# Patient Record
Sex: FEMALE | Race: BLACK OR AFRICAN AMERICAN
[De-identification: names, ages, dates, MRNs, and addresses within clinical notes are randomized per-mention and may not be internally consistent; named-entity substitution may affect disease eponyms.]

---

## 2017-07-29 ENCOUNTER — HOSPITAL ENCOUNTER (EMERGENCY)
Dept: HOSPITAL 63 - ER | Age: 37
LOS: 1 days | Discharge: HOME | End: 2017-07-30
Payer: COMMERCIAL

## 2017-07-29 VITALS — HEIGHT: 69 IN | BODY MASS INDEX: 43.4 KG/M2 | WEIGHT: 293 LBS

## 2017-07-29 VITALS — DIASTOLIC BLOOD PRESSURE: 81 MMHG | SYSTOLIC BLOOD PRESSURE: 132 MMHG

## 2017-07-29 DIAGNOSIS — I10: ICD-10-CM

## 2017-07-29 DIAGNOSIS — N39.0: Primary | ICD-10-CM

## 2017-07-29 PROCEDURE — 99284 EMERGENCY DEPT VISIT MOD MDM: CPT

## 2017-07-29 PROCEDURE — 81001 URINALYSIS AUTO W/SCOPE: CPT

## 2017-07-29 PROCEDURE — 87086 URINE CULTURE/COLONY COUNT: CPT

## 2017-07-30 LAB
APTT PPP: (no result) S
BACTERIA #/AREA URNS HPF: (no result) /HPF
BILIRUB UR QL STRIP: (no result)
FIBRINOGEN PPP-MCNC: (no result) MG/DL
GLUCOSE UR STRIP-MCNC: (no result) MG/DL
NITRITE UR QL STRIP: (no result)
RBC #/AREA URNS HPF: (no result) /HPF (ref 0–2)
SP GR UR STRIP: 1.01
SQUAMOUS #/AREA URNS LPF: (no result) /LPF
UROBILINOGEN UR-MCNC: 0.2 MG/DL

## 2017-08-02 NOTE — ED.ADGEN
Past History


Past Medical History:  Hypertension


Past Surgical History:  , Tubal ligation, Other


Alcohol Use:  Occasionally


Drug Use:  None





Adult General


Chief Complaint


Chief Complaint


Vaginal itching





HPI


HPI





Patient is a 36-year-old -American female presents with vaginal itching 

and dysuria symptoms began after unprotected intercourse beginning 2 weeks ago. 

Patient's concerned about possible STD or urinary tract infection. She is 

currently on her menstrual period which began 4 days ago. Patient took over-the-

counter treatment for yeast infection without improvement. Has not been 

evaluated by her primary care provider or another physician for complaint prior 

to ED arrival. Denies abdominal pain, pelvic pain, no fevers chills, nausea, 

vomiting and sweats. No other acute symptoms or complaints. Patient is 

currently on birth control.





Review of Systems


Review of Systems


Review symptoms as per history of present illness. All other review symptoms 

are negative.





Allergies


Allergies





Allergies








Coded Allergies Type Severity Reaction Last Updated Verified


 


  No Known Drug Allergies    11/5/15 No











Physical Exam


Physical Exam





Constitutional: Well developed, well nourished, no acute distress, non-toxic 

appearance. []


HENT: Normocephalic, atraumatic, bilateral external ears normal, oropharynx 

moist, no oral exudates, nose normal. []


Eyes: PERRL.]


Lungs & Thorax:  Bilateral breath sounds clear to auscultation []


Abdomen: Bowel sounds normal, soft, no tenderness, no masses, no pulsatile 

masses. []


Psychologic: Affect normal, judgement normal, mood normal. []





Current Patient Data


Vital Signs





 Vital Signs








  Date Time  Temp Pulse Resp B/P (MAP) Pulse Ox O2 Delivery O2 Flow Rate FiO2


 


17 23:20 98.0 82 20  99 Room Air  








Lab Results





 Laboratory Tests








Test


  17


23:59


 


Urine Collection Type Unknown  


 


Urine Color Straw  


 


Urine Clarity Hazy  


 


Urine pH 6.5  


 


Urine Specific Gravity 1.015  


 


Urine Protein


  Neg


(NEG-TRACE)


 


Urine Glucose (UA)


  Neg mg/dL


(NEG)


 


Urine Ketones (Stick)


  Neg mg/dL


(NEG)


 


Urine Blood Mod (NEG)  


 


Urine Nitrite Neg (NEG)  


 


Urine Bilirubin Neg (NEG)  


 


Urine Urobilinogen Dipstick


  0.2 mg/dL (0.2


mg/dL)


 


Urine Leukocyte Esterase Large (NEG)  


 


Urine RBC


  1-2 /HPF (0-2)


 


 


Urine WBC


  11-20 /HPF


(0-4)


 


Urine Squamous Epithelial


Cells Mod /LPF  


 


 


Urine Bacteria


  Mod /HPF


(0-FEW)


 


Urine Mucus Slight /LPF  








Microbiology


17 Urine Culture - Final, Complete


          


17 Urine Culture Result 1 (AMALIA) - Final, Complete





EKG


EKG


[]





Radiology/Procedures


Radiology/Procedures


[]





Course & Med Decision Making


Course & Med Decision Making


Pertinent Labs and Imaging studies reviewed. (See chart for details)





[Patient offered but declined pelvic evaluation in the emergency department. UA 

is positive. Will treat empirically with recommendations of following up with 

PCP or GYN early next week for evaluation.]





Final Impression


Final Impression


[#1 dysuria


 #2 UTI]


Problems:  





Dragon Disclaimer


Dragon Disclaimer


This electronic medical record was generated, in whole or in part, using a 

voice recognition dictation system.











ANDREI GARDUNO DO Aug 2, 2017 08:18

## 2017-09-04 ENCOUNTER — HOSPITAL ENCOUNTER (EMERGENCY)
Dept: HOSPITAL 63 - ER | Age: 37
Discharge: HOME | End: 2017-09-04
Payer: COMMERCIAL

## 2017-09-04 VITALS — DIASTOLIC BLOOD PRESSURE: 70 MMHG | SYSTOLIC BLOOD PRESSURE: 115 MMHG

## 2017-09-04 VITALS — BODY MASS INDEX: 43.4 KG/M2 | HEIGHT: 69 IN | WEIGHT: 293 LBS

## 2017-09-04 DIAGNOSIS — I10: ICD-10-CM

## 2017-09-04 DIAGNOSIS — A59.01: Primary | ICD-10-CM

## 2017-09-04 DIAGNOSIS — Z98.890: ICD-10-CM

## 2017-09-04 DIAGNOSIS — Z98.51: ICD-10-CM

## 2017-09-04 LAB
APTT PPP: YELLOW S
BACTERIA #/AREA URNS HPF: (no result) /HPF
BILIRUB UR QL STRIP: (no result)
FIBRINOGEN PPP-MCNC: (no result) MG/DL
GLUCOSE UR STRIP-MCNC: (no result) MG/DL
NITRITE UR QL STRIP: (no result)
RBC #/AREA URNS HPF: (no result) /HPF (ref 0–2)
SP GR UR STRIP: 1.02
SQUAMOUS #/AREA URNS LPF: (no result) /LPF
T VAGINALIS URNS QL MICRO: PRESENT
UROBILINOGEN UR-MCNC: 2 MG/DL
WBC #/AREA URNS HPF: (no result) /HPF (ref 0–4)

## 2017-09-04 PROCEDURE — 87591 N.GONORRHOEAE DNA AMP PROB: CPT

## 2017-09-04 PROCEDURE — 81001 URINALYSIS AUTO W/SCOPE: CPT

## 2017-09-04 PROCEDURE — 87086 URINE CULTURE/COLONY COUNT: CPT

## 2017-09-04 PROCEDURE — 36415 COLL VENOUS BLD VENIPUNCTURE: CPT

## 2017-09-04 PROCEDURE — 87491 CHLMYD TRACH DNA AMP PROBE: CPT

## 2017-09-04 PROCEDURE — 99284 EMERGENCY DEPT VISIT MOD MDM: CPT

## 2017-09-04 PROCEDURE — 81025 URINE PREGNANCY TEST: CPT

## 2017-09-04 NOTE — PHYS DOC
Past History


Past Medical History:  Hypertension


Past Surgical History:  , Tubal ligation, Other


Alcohol Use:  Occasionally


Drug Use:  None





Adult General


HPI


HPI





Patient is a 37-year-old female with a history of hypertension and smoking who 

presents with complaints of vaginal discharge. Patient denies any fevers, chills

, diarrhea, vomiting. Patient has no other complaints other than vaginal 

discharge. Denies dysuria, denies being pregnant.





Review of Systems


Review of Systems





Constitutional: Denies fever or chills []





HENT: Denies nasal congestion or sore throat []


Respiratory: Denies cough or shortness of breath []


Cardiovascular: No chest pain


GI: Denies abdominal pain, nausea, vomiting, bloody stools or diarrhea []


: Denies dysuria or hematuria. Yesterday vaginal discharge. No pelvic pain


Musculoskeletal: Denies back pain or joint pain []


Integument: Denies rash or skin lesions []


Neurologic: Denies headache, focal weakness or sensory changes []





Allergies


Allergies





Allergies








Coded Allergies Type Severity Reaction Last Updated Verified


 


  No Known Drug Allergies    11/5/15 No











Physical Exam


Physical Exam





Constitutional: Well developed, well nourished, no acute distress, non-toxic 

appearance. []


HENT: Normocephalic, atraumatic, 


Eyes: EOMI, conjunctiva normal, no discharge. [] 


Neck: Normal range of motion, trachea midline, no stridor. [] 


Cardiovascular:Heart rate regular rhythm, no murmur, normal perfusion


Lungs & Thorax:  Bilateral breath sounds clear to auscultation, no tachypnea


Abdomen: Bowel sounds normal, soft, no tenderness, no masses, no pulsatile 

masses. [] 


: External exam normal, OS closed and no lesions. Moderate amount of yellow 

to greenish discharge. No CMT, no adnexal tenderness or masses


Skin: Warm, dry, no erythema, no rash. [] 


Back: No tenderness, no CVA tenderness. [] 


Extremities: No tenderness, no cyanosis, no DVT, ROM intact, no edema. [] 


Neurologic: Alert and oriented X 3, normal motor function, normal ambulation, 

no focal deficits noted. []


Psychologic: Affect normal, judgement normal, mood normal. []





EKG


EKG


[]





Radiology/Procedures


Radiology/Procedures


[]





Course & Med Decision Making


Course & Med Decision Making


Pertinent Labs and Imaging studies reviewed. (See chart for details)





1703 and discussed the results with the patient understands the need to follow-

up and get rechecked at the health Department or the STD clinic. We confirmed 

with the patient at the phone number we have for her is the correct one, nurse 

was present when discussion took place. Since they plan media and gonorrhea 

test is a send out it will be ready today so the patient will be contacted with 

the results and the possible and then arrangements will be made for her to get 

her medication. Patient understands that and again reaffirms to us that we have 

the correct phone number. I have informed the patient of the other results 

which are consistent with the exam and there positive for Trichomonas. The 

patient didn't display any CMT I am not worried about PID more concerning with 

gonorrhea and chlamydia so I will not treat at this time and will wait for the 

results, patient understands and agrees.





[]





Dragon Disclaimer


Dragon Disclaimer


This chart was dictated in whole or in part using Voice Recognition software in 

a busy, high-work load, and often noisy Emergency Department environment.  It 

may contain unintended and wholly unrecognized errors or omissions.





Departure


Departure:


Impression:  


 Primary Impression:  


 Trichomonal vaginitis


Disposition:   HOME, SELF-CARE


Condition:  STABLE


Referrals:  


ELAINA DUNN MD (PCP)


Please follow-up with your doctor in 3-5 days for recheck and reevaluation. 

Please make sure that her doctor knows about the pending results of the 

gonorrhea and chlamydia test that were run today. Please take your antibiotics 

as prescribed and to finish them. Please remember to that he needs to be tested 

for other potential STDs.


Patient Instructions:  Trichomoniasis


Scripts


Metronidazole (FLAGYL) 500 Mg Tablet


1 TAB PO BID, #14 TAB


   Prov: JAGUAR HAWKINS MD         17











JAGUAR HAWKINS MD Sep 4, 2017 16:14

## 2018-05-06 ENCOUNTER — HOSPITAL ENCOUNTER (EMERGENCY)
Dept: HOSPITAL 63 - ER | Age: 38
LOS: 1 days | Discharge: HOME | End: 2018-05-07
Payer: COMMERCIAL

## 2018-05-06 VITALS — WEIGHT: 293 LBS | BODY MASS INDEX: 45.99 KG/M2 | HEIGHT: 67 IN

## 2018-05-06 VITALS — DIASTOLIC BLOOD PRESSURE: 77 MMHG | SYSTOLIC BLOOD PRESSURE: 145 MMHG

## 2018-05-06 DIAGNOSIS — I10: ICD-10-CM

## 2018-05-06 DIAGNOSIS — M54.5: Primary | ICD-10-CM

## 2018-05-06 DIAGNOSIS — Z87.440: ICD-10-CM

## 2018-05-06 PROCEDURE — 99284 EMERGENCY DEPT VISIT MOD MDM: CPT

## 2018-05-06 PROCEDURE — 81003 URINALYSIS AUTO W/O SCOPE: CPT

## 2018-05-07 LAB
APTT PPP: YELLOW S
BILIRUB UR QL STRIP: (no result)
FIBRINOGEN PPP-MCNC: (no result) MG/DL
GLUCOSE UR STRIP-MCNC: (no result) MG/DL
NITRITE UR QL STRIP: (no result)
SP GR UR STRIP: 1.02
UROBILINOGEN UR-MCNC: 4 MG/DL

## 2018-05-07 NOTE — ED.ADGEN
Past History


Past Medical History:  Hypertension, UTI


Past Surgical History:  , Tubal ligation, Other


Alcohol Use:  Rarely


Drug Use:  None





Adult General


Chief Complaint


Chief Complaint


Low back pain





HPI


HPI





Patient is a 37-year-old Afro-American lady who presents with back pain for the 

past 5 days. Patient reports lower midline back pain worse with sitting and 

partially relieved with standing. Pain is nonradiating and is described as dull 

and is rated as moderate. Patient denies injury or chronic back pain. She 

states she frequently has back pain with urinary tract infections. Reports dark 

colored urine but denies urinary frequency urgency, hematuria or flank or 

abdominal pain. No nausea vomiting or sweats. No saddle anesthesia, motor 

weakness or loss of sensation. No Patient's been taking ibuprofen and Tylenol 

with limited relief. She currently has appointment scheduled with her primary 

care physician in 4 days. Last menstrual period was 4 days ago. []





Review of Systems


Review of Systems


Review symptoms as per history of present illness. All other review symptoms 

are negative.  []





All other systems were reviewed and found to be within normal limits, except as 

documented in this note.





Allergies


Allergies





Allergies








Coded Allergies Type Severity Reaction Last Updated Verified


 


  No Known Drug Allergies    11/5/15 No











Physical Exam


Physical Exam





Constitutional: Well developed, well nourished, no acute distress, non-toxic 

appearance. []


HENT: Normocephalic, atraumatic, bilateral external ears normal, oropharynx 

moist,nose normal. []


Eyes: PERRLA, EOMI, conjunctiva normal, no discharge. [] 


Neck: Normal range of motion. [] 


Cardiovascular:Heart rate regular rhythm, no murmur []


Lungs & Thorax:  Bilateral breath sounds clear to auscultation []


Abdomen: Bowel sounds normal, soft, no tenderness. [] 


Back: No tenderness. Diffuse low back pain, pain reproduces with palpation and 

rotation.[] 


Neurologic: Alert and oriented X 3, normal motor function, normal sensory 

function, no focal deficits noted. []


Psychologic: Affect normal, judgement normal, mood normal. []





Current Patient Data


Vital Signs





 Vital Signs








  Date Time  Temp Pulse Resp B/P (MAP) Pulse Ox O2 Delivery O2 Flow Rate FiO2


 


18 23:45 98.4 79 18  100 Room Air  








Lab Results





 Laboratory Tests








Test


  18


00:05


 


Urine Collection Type Unknown  


 


Urine Color Yellow  


 


Urine Clarity Hazy  


 


Urine pH 7.0  


 


Urine Specific Gravity 1.025  


 


Urine Protein


  Neg


(NEG-TRACE)


 


Urine Glucose (UA)


  Neg mg/dL


(NEG)


 


Urine Ketones (Stick)


  Neg mg/dL


(NEG)


 


Urine Blood Neg (NEG)  


 


Urine Nitrite Neg (NEG)  


 


Urine Bilirubin Neg (NEG)  


 


Urine Urobilinogen Dipstick


  4 mg/dL (0.2


mg/dL)


 


Urine Leukocyte Esterase Neg (NEG)  











EKG


EKG


[]





Radiology/Procedures


Radiology/Procedures


[]





Course & Med Decision Making


Course & Med Decision Making


Pertinent Labs and Imaging studies reviewed. (See chart for details)





[Reproducible muscle skeletal pain without neurologic deficits. UA 

unremarkable. Recommendations are supportive care with PCP follow-up as 

scheduled.]





Final Impression


Final Impression


[#1 acute low back pain]


Problems:  





Dragon Disclaimer


Dragon Disclaimer


This electronic medical record was generated, in whole or in part, using a 

voice recognition dictation system.











ANDREI GARDUNO DO May 7, 2018 00:48

## 2019-01-06 ENCOUNTER — HOSPITAL ENCOUNTER (EMERGENCY)
Dept: HOSPITAL 63 - ER | Age: 39
LOS: 1 days | Discharge: HOME | End: 2019-01-07
Payer: SELF-PAY

## 2019-01-06 VITALS — DIASTOLIC BLOOD PRESSURE: 80 MMHG | SYSTOLIC BLOOD PRESSURE: 147 MMHG

## 2019-01-06 DIAGNOSIS — K59.00: Primary | ICD-10-CM

## 2019-01-06 DIAGNOSIS — R19.7: ICD-10-CM

## 2019-01-06 LAB
ALBUMIN SERPL-MCNC: 3.1 G/DL (ref 3.4–5)
ALBUMIN/GLOB SERPL: 0.6 {RATIO} (ref 1–1.7)
ALP SERPL-CCNC: 85 U/L (ref 46–116)
ALT SERPL-CCNC: 29 U/L (ref 14–59)
ANION GAP SERPL CALC-SCNC: 12 MMOL/L (ref 6–14)
APTT PPP: YELLOW S
AST SERPL-CCNC: 26 U/L (ref 15–37)
BACTERIA #/AREA URNS HPF: (no result) /HPF
BASOPHILS # BLD AUTO: 0.1 X10^3/UL (ref 0–0.2)
BASOPHILS NFR BLD: 1 % (ref 0–3)
BILIRUB SERPL-MCNC: 0.6 MG/DL (ref 0.2–1)
BILIRUB UR QL STRIP: (no result)
BUN/CREAT SERPL: 9 (ref 6–20)
CA-I SERPL ISE-MCNC: 9 MG/DL (ref 7–20)
CALCIUM SERPL-MCNC: 8.8 MG/DL (ref 8.5–10.1)
CHLORIDE SERPL-SCNC: 100 MMOL/L (ref 98–107)
CO2 SERPL-SCNC: 27 MMOL/L (ref 21–32)
CREAT SERPL-MCNC: 1 MG/DL (ref 0.6–1)
EOSINOPHIL NFR BLD: 0.2 X10^3/UL (ref 0–0.7)
EOSINOPHIL NFR BLD: 1 % (ref 0–3)
ERYTHROCYTE [DISTWIDTH] IN BLOOD BY AUTOMATED COUNT: 17.2 % (ref 11.5–14.5)
FIBRINOGEN PPP-MCNC: (no result) MG/DL
GFR SERPLBLD BASED ON 1.73 SQ M-ARVRAT: 74.3 ML/MIN
GLOBULIN SER-MCNC: 5.1 G/DL (ref 2.2–3.8)
GLUCOSE SERPL-MCNC: 117 MG/DL (ref 70–99)
GLUCOSE UR STRIP-MCNC: (no result) MG/DL
HCT VFR BLD CALC: 35 % (ref 36–47)
HGB BLD-MCNC: 10.8 G/DL (ref 12–15.5)
HYPOCHROMIA BLD QL SMEAR: SLIGHT
LIPASE: 298 U/L (ref 73–393)
LYMPHOCYTES # BLD: 2.4 X10^3/UL (ref 1–4.8)
LYMPHOCYTES NFR BLD AUTO: 19 % (ref 24–48)
MCH RBC QN AUTO: 22 PG (ref 25–35)
MCHC RBC AUTO-ENTMCNC: 31 G/DL (ref 31–37)
MCV RBC AUTO: 73 FL (ref 79–100)
MONO #: 1 X10^3/UL (ref 0–1.1)
MONOCYTES NFR BLD: 8 % (ref 0–9)
NEUT #: 8.7 X10^3UL (ref 1.8–7.7)
NEUTROPHILS NFR BLD AUTO: 71 % (ref 31–73)
NITRITE UR QL STRIP: (no result)
PLATELET # BLD AUTO: 395 X10^3/UL (ref 140–400)
PLATELET # BLD EST: (no result) 10*3/UL
POLYCHROMASIA BLD QL SMEAR: SLIGHT
POTASSIUM SERPL-SCNC: 3.2 MMOL/L (ref 3.5–5.1)
PROT SERPL-MCNC: 8.2 G/DL (ref 6.4–8.2)
RBC # BLD AUTO: 4.83 X10^6/UL (ref 3.5–5.4)
RBC #/AREA URNS HPF: 0 /HPF (ref 0–2)
SODIUM SERPL-SCNC: 139 MMOL/L (ref 136–145)
SP GR UR STRIP: 1.01
SQUAMOUS #/AREA URNS LPF: (no result) /LPF
UROBILINOGEN UR-MCNC: 1 MG/DL
WBC # BLD AUTO: 12.2 X10^3/UL (ref 4–11)
WBC #/AREA URNS HPF: (no result) /HPF (ref 0–4)

## 2019-01-06 PROCEDURE — 81025 URINE PREGNANCY TEST: CPT

## 2019-01-06 PROCEDURE — 85025 COMPLETE CBC W/AUTO DIFF WBC: CPT

## 2019-01-06 PROCEDURE — 83690 ASSAY OF LIPASE: CPT

## 2019-01-06 PROCEDURE — 87086 URINE CULTURE/COLONY COUNT: CPT

## 2019-01-06 PROCEDURE — 99283 EMERGENCY DEPT VISIT LOW MDM: CPT

## 2019-01-06 PROCEDURE — 81001 URINALYSIS AUTO W/SCOPE: CPT

## 2019-01-06 PROCEDURE — 80053 COMPREHEN METABOLIC PANEL: CPT

## 2019-01-06 PROCEDURE — 36415 COLL VENOUS BLD VENIPUNCTURE: CPT

## 2019-01-06 NOTE — PHYS DOC
Adult General


Chief Complaint


Chief Complaint


abd pain





HPI


HPI


38 years old female presented emergency department with abdominal pain 

described as cramps all over her abdomen comes and goes loss for about 20 

minutes associated with the constipation and diarrhea for the past 3 weeks no 

fever no chills no vomiting





Review of Systems


Review of Systems





Constitutional: Denies fever or chills []


Eyes: Denies change in visual acuity, redness, or eye pain []


HENT: Denies nasal congestion or sore throat []


Respiratory: Denies cough or shortness of breath []


Cardiovascular: No additional information not addressed in HPI []]


: Denies dysuria or hematuria []


Musculoskeletal: Denies back pain or joint pain []


Integument: Denies rash or skin lesions []


Neurologic: Denies headache, focal weakness or sensory changes []


Endocrine: Denies polyuria or polydipsia []





All other systems were reviewed and found to be within normal limits, except as 

documented in this note.





Allergies


Allergies





Allergies








Coded Allergies Type Severity Reaction Last Updated Verified


 


  No Known Drug Allergies    11/5/15 No











Physical Exam


Physical Exam





Constitutional: Well developed, well nourished, no acute distress, non-toxic 

appearance. []


HENT: Normocephalic, atraumatic, bilateral external ears normal, oropharynx 

moist, no oral exudates, nose normal. []


Eyes: PERRLA, EOMI, conjunctiva normal, no discharge. [] 


Neck: Normal range of motion, no tenderness, supple, no stridor. [] 


Cardiovascular:Heart rate regular rhythm, no murmur []


Lungs & Thorax:  Bilateral breath sounds clear to auscultation []


Abdomen: Bowel sounds normal, soft, no tenderness, no masses, no pulsatile 

masses. [] 


Skin: Warm, dry, no erythema, no rash. [] 


Back: No tenderness, no CVA tenderness. [] 


Extremities: No tenderness, no cyanosis, no clubbing, ROM intact, no edema. [] 


Neurologic: Alert and oriented X 3, normal motor function, normal sensory 

function, no focal deficits noted. []


Psychologic: Affect normal, judgement normal, mood normal. []





Current Patient Data


Vital Signs





 Vital Signs








  Date Time  Temp Pulse Resp B/P (MAP) Pulse Ox O2 Delivery O2 Flow Rate FiO2


 


1/6/19 23:29  92 18 147/80 (102) 98 Room Air  


 


1/6/19 22:17 98.1       








Lab Results





 Laboratory Tests








Test


 1/6/19


22:35 1/6/19


22:40 1/6/19


22:59


 


White Blood Count


 12.2 x10^3/uL


(4.0-11.0)  H 


 





 


Red Blood Count


 4.83 x10^6/uL


(3.50-5.40) 


 





 


Hemoglobin


 10.8 g/dL


(12.0-15.5)  L 


 





 


Hematocrit


 35.0 %


(36.0-47.0)  L 


 





 


Mean Corpuscular Volume


 73 fL ()


L 


 





 


Mean Corpuscular Hemoglobin


 22 pg (25-35)


L 


 





 


Mean Corpuscular Hemoglobin


Concent 31 g/dL


(31-37) 


 





 


Red Cell Distribution Width


 17.2 %


(11.5-14.5)  H 


 





 


Platelet Count


 395 x10^3/uL


(140-400) 


 





 


Neutrophils (%) (Auto) 71 % (31-73)    


 


Lymphocytes (%) (Auto) 19 % (24-48)  L  


 


Monocytes (%) (Auto) 8 % (0-9)    


 


Eosinophils (%) (Auto) 1 % (0-3)    


 


Basophils (%) (Auto) 1 % (0-3)    


 


Neutrophils # (Auto)


 8.7 x10^3uL


(1.8-7.7)  H 


 





 


Lymphocytes # (Auto)


 2.4 x10^3/uL


(1.0-4.8) 


 





 


Monocytes # (Auto)


 1.0 x10^3/uL


(0.0-1.1) 


 





 


Eosinophils # (Auto)


 0.2 x10^3/uL


(0.0-0.7) 


 





 


Basophils # (Auto)


 0.1 x10^3/uL


(0.0-0.2) 


 





 


Platelet Estimate Pending    


 


Sodium Level


 139 mmol/L


(136-145) 


 





 


Potassium Level


 3.2 mmol/L


(3.5-5.1)  L 


 





 


Chloride Level


 100 mmol/L


() 


 





 


Carbon Dioxide Level


 27 mmol/L


(21-32) 


 





 


Anion Gap 12 (6-14)    


 


Blood Urea Nitrogen


 9 mg/dL (7-20)


 


 





 


Creatinine


 1.0 mg/dL


(0.6-1.0) 


 





 


Estimated GFR


(Cockcroft-Gault) 74.3  


 


 





 


BUN/Creatinine Ratio 9 (6-20)    


 


Glucose Level


 117 mg/dL


(70-99)  H 


 





 


Calcium Level


 8.8 mg/dL


(8.5-10.1) 


 





 


Total Bilirubin


 0.6 mg/dL


(0.2-1.0) 


 





 


Aspartate Amino Transferase


(AST) 26 U/L (15-37)


 


 





 


Alanine Aminotransferase (ALT)


 29 U/L (14-59)


 


 





 


Alkaline Phosphatase


 85 U/L


() 


 





 


Total Protein


 8.2 g/dL


(6.4-8.2) 


 





 


Albumin


 3.1 g/dL


(3.4-5.0)  L 


 





 


Albumin/Globulin Ratio


 0.6 (1.0-1.7)


L 


 





 


Lipase


 298 U/L


() 


 





 


Urine Collection Type  Unknown   


 


Urine Color  Yellow   


 


Urine Clarity  Hazy   


 


Urine pH  6.5   


 


Urine Specific Gravity  1.010   


 


Urine Protein


 


 Neg


(NEG-TRACE) 





 


Urine Glucose (UA)


 


 Neg mg/dL


(NEG) 





 


Urine Ketones (Stick)


 


 Neg mg/dL


(NEG) 





 


Urine Blood  Neg (NEG)   


 


Urine Nitrite  Neg (NEG)   


 


Urine Bilirubin  Neg (NEG)   


 


Urine Urobilinogen Dipstick


 


 1 mg/dL (0.2


mg/dL) 





 


Urine Leukocyte Esterase  Trace (NEG)   


 


Urine RBC  0 /HPF (0-2)   


 


Urine WBC


 


 Occ /HPF (0-4)


 





 


Urine Squamous Epithelial


Cells 


 Mod /LPF  


 





 


Urine Bacteria


 


 Few /HPF


(0-FEW) 





 


POC Urine HCG, Qualitative


 


 


 hcg negative


(Negative)











EKG


EKG


[]





Radiology/Procedures


Radiology/Procedures


Patient refused plain x-rays or CT scan of the abdomen, stated I do not want to 

be exposed to radiation  I explained to the patient that we needed for 

diagnosis and we can misdiagnoses and we don't habits she verbalizes 

understanding to the risk and benefits[]





Course & Med Decision Making


Course & Med Decision Making


Pertinent Labs and Imaging studies reviewed. (See chart for details)





[]





Final Impression


Final Impression


[]


Problems:  


(1) Constipation


Qualifiers:  


   Qualified Codes:  K59.00 - Constipation, unspecified





Dragon Disclaimer


Dragon Disclaimer


This electronic medical record was generated, in whole or in part, using a 

voice recognition dictation system.











TEGAN ARROYO MD Jan 6, 2019 23:42

## 2019-07-24 ENCOUNTER — HOSPITAL ENCOUNTER (EMERGENCY)
Dept: HOSPITAL 63 - ER | Age: 39
Discharge: HOME | End: 2019-07-24
Payer: COMMERCIAL

## 2019-07-24 VITALS — SYSTOLIC BLOOD PRESSURE: 166 MMHG | DIASTOLIC BLOOD PRESSURE: 89 MMHG

## 2019-07-24 VITALS — HEIGHT: 67 IN | WEIGHT: 293 LBS | BODY MASS INDEX: 45.99 KG/M2

## 2019-07-24 DIAGNOSIS — Z98.51: ICD-10-CM

## 2019-07-24 DIAGNOSIS — Z87.440: ICD-10-CM
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
## 2019-07-24 DIAGNOSIS — Z98.890: ICD-10-CM

## 2019-07-24 DIAGNOSIS — S16.1XXA: Primary | ICD-10-CM
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
## 2019-07-24 DIAGNOSIS — I10: ICD-10-CM

## 2019-07-24 DIAGNOSIS — M54.5: ICD-10-CM

## 2019-07-24 PROCEDURE — 99283 EMERGENCY DEPT VISIT LOW MDM: CPT
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
## 2019-08-07 ENCOUNTER — HOSPITAL ENCOUNTER (EMERGENCY)
Dept: HOSPITAL 63 - ER | Age: 39
Discharge: HOME | End: 2019-08-07
Payer: COMMERCIAL

## 2019-08-07 VITALS — HEIGHT: 70 IN | BODY MASS INDEX: 41.95 KG/M2 | WEIGHT: 293 LBS

## 2019-08-07 VITALS — SYSTOLIC BLOOD PRESSURE: 113 MMHG | DIASTOLIC BLOOD PRESSURE: 72 MMHG

## 2019-08-07 DIAGNOSIS — S39.012D: Primary | ICD-10-CM

## 2019-08-07 DIAGNOSIS — I10: ICD-10-CM
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
## 2019-08-07 PROCEDURE — 99283 EMERGENCY DEPT VISIT LOW MDM: CPT
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
## 2019-08-20 ENCOUNTER — HOSPITAL ENCOUNTER (EMERGENCY)
Dept: HOSPITAL 63 - ER | Age: 39
Discharge: HOME | End: 2019-08-20
Payer: COMMERCIAL

## 2019-08-20 VITALS — DIASTOLIC BLOOD PRESSURE: 78 MMHG | SYSTOLIC BLOOD PRESSURE: 155 MMHG

## 2019-08-20 VITALS — WEIGHT: 293 LBS | BODY MASS INDEX: 41.95 KG/M2 | HEIGHT: 70 IN

## 2019-08-20 DIAGNOSIS — S39.012A: ICD-10-CM

## 2019-08-20 DIAGNOSIS — S29.012A: Primary | ICD-10-CM
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
## 2019-08-20 DIAGNOSIS — I10: ICD-10-CM
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
## 2019-08-20 PROCEDURE — 99283 EMERGENCY DEPT VISIT LOW MDM: CPT
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
## 2019-08-26 ENCOUNTER — HOSPITAL ENCOUNTER (EMERGENCY)
Dept: HOSPITAL 63 - ER | Age: 39
Discharge: HOME | End: 2019-08-26
Payer: SELF-PAY

## 2019-08-26 VITALS — DIASTOLIC BLOOD PRESSURE: 88 MMHG | SYSTOLIC BLOOD PRESSURE: 149 MMHG

## 2019-08-26 VITALS — BODY MASS INDEX: 41.95 KG/M2 | WEIGHT: 293 LBS | HEIGHT: 70 IN
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
## 2019-08-26 DIAGNOSIS — S80.01XA: Primary | ICD-10-CM

## 2019-08-26 DIAGNOSIS — I10: ICD-10-CM
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
## 2019-08-26 DIAGNOSIS — M25.562: ICD-10-CM
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
## 2019-08-26 PROCEDURE — 73564 X-RAY EXAM KNEE 4 OR MORE: CPT

## 2019-08-26 PROCEDURE — 99284 EMERGENCY DEPT VISIT MOD MDM: CPT

## 2019-08-26 PROCEDURE — 29505 APPLICATION LONG LEG SPLINT: CPT

## 2019-08-26 NOTE — PHYS DOC
Past History


Past Medical History:  Hypertension


Past Surgical History:  Other


Alcohol Use:  None


Drug Use:  None





Adult General


Chief Complaint


Chief Complaint:  MECHANICAL FALL





[facility] [place]


HPI





Patient is a 38-year-old female who presents with complaint of bilateral knee 

pain after falling last night at about 7 PM. She states that she fell onto both 

knees. She states that she is having difficulty with walking now due to the 

pain. She states that the pain in her left knee is very mild but her right knee 

is a 10 out of 10. She did sustain an abrasion to the knee. She states that 

there is a bony lump in her knee now. She states that pain is worsened with 

weightbearing. She states that nothing improves her pain.[]





Review of Systems


Review of Systems





Constitutional: Denies fever or chills []


Respiratory: Denies cough or shortness of breath []


Cardiovascular: No additional information not addressed in HPI []


Musculoskeletal: Complains of bilateral knee pain []


Integument: Positive abrasion[]





Allergies


Allergies





Allergies








Coded Allergies Type Severity Reaction Last Updated Verified


 


  No Known Drug Allergies    8/20/19 No











Physical Exam


Physical Exam





Constitutional: Well developed, well nourished, no acute distress, non-toxic a

ppearance. []


Cardiovascular:Heart rate regular rhythm, no murmur []


Lungs & Thorax:  Bilateral breath sounds clear to auscultation []


Skin: There is a small abrasion, measuring approximately 2 similar to in 

diameter noted to the anterior aspect of right knee. [] 


Extremities: Patient complains of tenderness to right knee. Range of motion and 

ligamentous testing is limited due to patient's reported pain. []





EKG


EKG


[]





Radiology/Procedures


Radiology/Procedures


[]





Course & Med Decision Making


Course & Med Decision Making


Pertinent Labs and Imaging studies reviewed. (See chart for details)





[]





Dragon Disclaimer


Dragon Disclaimer


This electronic medical record was generated, in whole or in part, using a voice

 recognition dictation system.





Departure


Departure:


Impression:  


   Primary Impression:  


   Contusion of right knee


Disposition:  01 HOME, SELF-CARE


Condition:  STABLE


Referrals:  


SELWYN GALINDO MD (PCP)


Patient Instructions:  Contusion, Form - Excuse from Work, School, or Physical 

Activity





Additional Instructions:  


Follow-up with primary care provider and schedule follow-up appointment with 

orthopedist for further evaluation.


Scripts


Diclofenac Sodium (DICLOFENAC SODIUM) 50 Mg Tablet.dr


1 TAB PO BID PRN for PAIN, #20 TAB


   Prov: BRONSON DUFFY Jr. DO         8/26/19 


Hydrocodone Bit/Acetaminophen (NORCO 5-325 TABLET) 1 Each Tablet


1 TAB PO PRN Q6HRS PRN for PAIN, #10 TAB 0 Refills


   Prov: BRONSON DUFFY Jr. DO         8/26/19





Problem Qualifiers








   Primary Impression:  


   Contusion of right knee


   Encounter type:  initial encounter  Qualified Codes:  S80.01XA - Contusion of

    right knee, initial encounter








BRONSON DUFFY Jr. DO          Aug 26, 2019 09:00
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
## 2019-09-16 ENCOUNTER — HOSPITAL ENCOUNTER (EMERGENCY)
Dept: HOSPITAL 63 - ER | Age: 39
Discharge: HOME | End: 2019-09-16
Payer: COMMERCIAL

## 2019-09-16 VITALS — DIASTOLIC BLOOD PRESSURE: 69 MMHG | SYSTOLIC BLOOD PRESSURE: 124 MMHG

## 2019-09-16 VITALS — WEIGHT: 293 LBS | BODY MASS INDEX: 41.95 KG/M2 | HEIGHT: 70 IN

## 2019-09-16 DIAGNOSIS — M54.5: ICD-10-CM

## 2019-09-16 DIAGNOSIS — I10: ICD-10-CM

## 2019-09-16 DIAGNOSIS — M54.6: Primary | ICD-10-CM

## 2019-09-16 PROCEDURE — 99281 EMR DPT VST MAYX REQ PHY/QHP: CPT
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
## 2019-10-21 ENCOUNTER — HOSPITAL ENCOUNTER (EMERGENCY)
Dept: HOSPITAL 63 - ER | Age: 39
Discharge: HOME | End: 2019-10-21
Payer: COMMERCIAL

## 2019-10-21 VITALS — WEIGHT: 293 LBS | BODY MASS INDEX: 41.95 KG/M2 | HEIGHT: 70 IN

## 2019-10-21 VITALS — SYSTOLIC BLOOD PRESSURE: 141 MMHG | DIASTOLIC BLOOD PRESSURE: 83 MMHG

## 2019-10-21 DIAGNOSIS — M54.5: Primary | ICD-10-CM

## 2019-10-21 DIAGNOSIS — Z98.51: ICD-10-CM

## 2019-10-21 DIAGNOSIS — I10: ICD-10-CM

## 2019-10-21 DIAGNOSIS — Z98.890: ICD-10-CM

## 2019-10-21 PROCEDURE — 99281 EMR DPT VST MAYX REQ PHY/QHP: CPT
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
## 2020-08-08 ENCOUNTER — HOSPITAL ENCOUNTER (EMERGENCY)
Dept: HOSPITAL 63 - ER | Age: 40
LOS: 1 days | Discharge: LEFT BEFORE DISCHARGE | End: 2020-08-09
Payer: COMMERCIAL

## 2020-08-08 VITALS — WEIGHT: 293 LBS | HEIGHT: 70 IN | BODY MASS INDEX: 41.95 KG/M2

## 2020-08-08 DIAGNOSIS — I10: ICD-10-CM
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
## 2020-08-08 DIAGNOSIS — G89.11: ICD-10-CM
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
## 2020-08-08 DIAGNOSIS — M25.562: ICD-10-CM

## 2020-08-08 DIAGNOSIS — M54.2: ICD-10-CM
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
## 2020-08-08 DIAGNOSIS — M25.511: Primary | ICD-10-CM

## 2020-08-08 DIAGNOSIS — M54.6: ICD-10-CM

## 2020-08-08 PROCEDURE — 73562 X-RAY EXAM OF KNEE 3: CPT

## 2020-08-08 PROCEDURE — 73030 X-RAY EXAM OF SHOULDER: CPT

## 2020-08-08 PROCEDURE — 70450 CT HEAD/BRAIN W/O DYE: CPT

## 2020-08-08 PROCEDURE — 99285 EMERGENCY DEPT VISIT HI MDM: CPT

## 2020-08-08 PROCEDURE — 72125 CT NECK SPINE W/O DYE: CPT

## 2020-08-08 PROCEDURE — 72128 CT CHEST SPINE W/O DYE: CPT

## 2020-08-08 PROCEDURE — 71046 X-RAY EXAM CHEST 2 VIEWS: CPT

## 2020-08-09 ENCOUNTER — HOSPITAL ENCOUNTER (EMERGENCY)
Dept: HOSPITAL 63 - ER | Age: 40
Discharge: HOME | End: 2020-08-09
Payer: COMMERCIAL

## 2020-08-09 VITALS
HEIGHT: 70 IN | BODY MASS INDEX: 41.95 KG/M2 | WEIGHT: 293 LBS | SYSTOLIC BLOOD PRESSURE: 133 MMHG | DIASTOLIC BLOOD PRESSURE: 85 MMHG

## 2020-08-09 VITALS — DIASTOLIC BLOOD PRESSURE: 90 MMHG | SYSTOLIC BLOOD PRESSURE: 143 MMHG

## 2020-08-09 DIAGNOSIS — M25.511: Primary | ICD-10-CM

## 2020-08-09 DIAGNOSIS — Z76.0: ICD-10-CM

## 2020-08-09 DIAGNOSIS — I10: ICD-10-CM

## 2020-08-09 PROCEDURE — 29105 APPLICATION LONG ARM SPLINT: CPT

## 2020-08-09 PROCEDURE — 99283 EMERGENCY DEPT VISIT LOW MDM: CPT
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
